# Patient Record
Sex: FEMALE | Race: WHITE | URBAN - METROPOLITAN AREA
[De-identification: names, ages, dates, MRNs, and addresses within clinical notes are randomized per-mention and may not be internally consistent; named-entity substitution may affect disease eponyms.]

---

## 2020-03-30 ENCOUNTER — APPOINTMENT (OUTPATIENT)
Dept: URBAN - METROPOLITAN AREA CLINIC 232 | Age: 65
Setting detail: DERMATOLOGY
End: 2020-03-31

## 2020-03-30 DIAGNOSIS — L98.8 OTHER SPECIFIED DISORDERS OF THE SKIN AND SUBCUTANEOUS TISSUE: ICD-10-CM

## 2020-03-30 DIAGNOSIS — K13.0 DISEASES OF LIPS: ICD-10-CM

## 2020-03-30 PROCEDURE — OTHER COUNSELING: OTHER

## 2020-03-30 PROCEDURE — 99202 OFFICE O/P NEW SF 15 MIN: CPT

## 2020-03-30 PROCEDURE — OTHER ADDITIONAL NOTES: OTHER

## 2020-03-30 ASSESSMENT — LOCATION SIMPLE DESCRIPTION DERM
LOCATION SIMPLE: RIGHT CHEEK
LOCATION SIMPLE: LEFT CHEEK
LOCATION SIMPLE: LEFT LIP
LOCATION SIMPLE: RIGHT LIP

## 2020-03-30 ASSESSMENT — LOCATION DETAILED DESCRIPTION DERM
LOCATION DETAILED: LEFT MEDIAL BUCCAL CHEEK
LOCATION DETAILED: LEFT ORAL COMMISSURE
LOCATION DETAILED: RIGHT ORAL COMMISSURE
LOCATION DETAILED: RIGHT MEDIAL BUCCAL CHEEK

## 2020-03-30 ASSESSMENT — LOCATION ZONE DERM
LOCATION ZONE: FACE
LOCATION ZONE: LIP

## 2020-03-30 NOTE — PROCEDURE: COUNSELING
Patient Specific Counseling (Will Not Stick From Patient To Patient): Explained in the antifungal spray is working she can continue but the real problem is anatomical since she has very deep oral commissures and marionette lines. She will try to keep the area covered with a barrier such as aquaphor or vaseline and she can apply these over the spray if desired.
Detail Level: Zone

## 2020-03-30 NOTE — PROCEDURE: ADDITIONAL NOTES
Additional Notes: Juvederm and cost was discussed. Cosmetic price list given. Pt is hoping to lose some more weight and has considered cosmetic treatment for her neck. Suggested she lose all the weight she wants to lose then see a plastic surgeon. Dr JANEL Marks was recommended and his information was given.
Additional Notes: See above. 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
Detail Level: Zone

## 2024-04-01 NOTE — HPI: RASH
What Type Of Note Output Would You Prefer (Optional)?: Standard Output
How Severe Is Your Rash?: moderate
Is This A New Presentation, Or A Follow-Up?: Rash
Additional History: Ma
No